# Patient Record
Sex: MALE | URBAN - METROPOLITAN AREA
[De-identification: names, ages, dates, MRNs, and addresses within clinical notes are randomized per-mention and may not be internally consistent; named-entity substitution may affect disease eponyms.]

---

## 2020-01-20 ENCOUNTER — PATIENT OUTREACH (OUTPATIENT)
Dept: CASE MANAGEMENT | Age: 65
End: 2020-01-20

## 2020-01-20 NOTE — PROGRESS NOTES
MONY DRAKE received referral from [facility] regarding any resources that may be available for pt regarding dementia. Pt's mother inquiring. MONY DRAKE reached out to pt's mother Glenys [place] [phone] but there was not answer. VMail box full. MONY DRAKE will attempt again in 24 hours. This note will not be viewable in [address]

## 2020-01-21 ENCOUNTER — PATIENT OUTREACH (OUTPATIENT)
Dept: CASE MANAGEMENT | Age: 65
End: 2020-01-21

## 2020-01-21 NOTE — PROGRESS NOTES
MONY DRAKE made 2nd attempt to reach pt's mother regarding referral for dementia resources in the community. Voicemail box still full. MONY DRAKE will attempt again in one week. This note will not be viewable in [address]

## 2020-01-28 ENCOUNTER — PATIENT OUTREACH (OUTPATIENT)
Dept: CASE MANAGEMENT | Age: 65
End: 2020-01-28

## 2020-01-28 NOTE — PROGRESS NOTES
MONY CM made third and final attempt to reach pt;s mother regarding referral for community resources for pt. No answer-VM still full. Case closed for now-can be reopened if indicated in the future. This note will not be viewable in [address]